# Patient Record
Sex: MALE | Race: WHITE | Employment: STUDENT | URBAN - METROPOLITAN AREA
[De-identification: names, ages, dates, MRNs, and addresses within clinical notes are randomized per-mention and may not be internally consistent; named-entity substitution may affect disease eponyms.]

---

## 2023-11-10 ENCOUNTER — APPOINTMENT (OUTPATIENT)
Dept: GENERAL RADIOLOGY | Age: 23
End: 2023-11-10
Attending: EMERGENCY MEDICINE
Payer: COMMERCIAL

## 2023-11-10 ENCOUNTER — HOSPITAL ENCOUNTER (EMERGENCY)
Age: 23
Discharge: HOME | End: 2023-11-10
Attending: EMERGENCY MEDICINE
Payer: COMMERCIAL

## 2023-11-10 VITALS
DIASTOLIC BLOOD PRESSURE: 71 MMHG | RESPIRATION RATE: 16 BRPM | WEIGHT: 110 LBS | HEIGHT: 63 IN | OXYGEN SATURATION: 97 % | TEMPERATURE: 98 F | HEART RATE: 75 BPM | SYSTOLIC BLOOD PRESSURE: 139 MMHG | BODY MASS INDEX: 19.49 KG/M2

## 2023-11-10 DIAGNOSIS — R07.9 CHEST PAIN OF UNCERTAIN ETIOLOGY: Primary | ICD-10-CM

## 2023-11-10 LAB
ALBUMIN SERPL-MCNC: 4.2 G/DL (ref 3.4–5)
ALBUMIN/GLOB SERPL: 1.2 {RATIO} (ref 1–2)
ALP LIVER SERPL-CCNC: 104 U/L
ALT SERPL-CCNC: 22 U/L
ANION GAP SERPL CALC-SCNC: 6 MMOL/L (ref 0–18)
AST SERPL-CCNC: 20 U/L (ref 15–37)
ATRIAL RATE: 101 BPM
BASOPHILS # BLD AUTO: 0.03 X10(3) UL (ref 0–0.2)
BASOPHILS NFR BLD AUTO: 0.4 %
BILIRUB SERPL-MCNC: 0.5 MG/DL (ref 0.1–2)
BUN BLD-MCNC: 12 MG/DL (ref 9–23)
CALCIUM BLD-MCNC: 8.9 MG/DL (ref 8.5–10.1)
CHLORIDE SERPL-SCNC: 105 MMOL/L (ref 98–112)
CO2 SERPL-SCNC: 27 MMOL/L (ref 21–32)
CREAT BLD-MCNC: 1.05 MG/DL
D DIMER PPP FEU-MCNC: <0.27 UG/ML FEU (ref ?–0.5)
EGFRCR SERPLBLD CKD-EPI 2021: 103 ML/MIN/1.73M2 (ref 60–?)
EOSINOPHIL # BLD AUTO: 0.14 X10(3) UL (ref 0–0.7)
EOSINOPHIL NFR BLD AUTO: 1.9 %
ERYTHROCYTE [DISTWIDTH] IN BLOOD BY AUTOMATED COUNT: 13.1 %
GLOBULIN PLAS-MCNC: 3.4 G/DL (ref 2.8–4.4)
GLUCOSE BLD-MCNC: 99 MG/DL (ref 70–99)
HCT VFR BLD AUTO: 45.2 %
HGB BLD-MCNC: 16.3 G/DL
IMM GRANULOCYTES # BLD AUTO: 0.02 X10(3) UL (ref 0–1)
IMM GRANULOCYTES NFR BLD: 0.3 %
LYMPHOCYTES # BLD AUTO: 1.9 X10(3) UL (ref 1–4)
LYMPHOCYTES NFR BLD AUTO: 25.2 %
MCH RBC QN AUTO: 30.2 PG (ref 26–34)
MCHC RBC AUTO-ENTMCNC: 36.1 G/DL (ref 31–37)
MCV RBC AUTO: 83.9 FL
MONOCYTES # BLD AUTO: 0.52 X10(3) UL (ref 0.1–1)
MONOCYTES NFR BLD AUTO: 6.9 %
NEUTROPHILS # BLD AUTO: 4.92 X10 (3) UL (ref 1.5–7.7)
NEUTROPHILS # BLD AUTO: 4.92 X10(3) UL (ref 1.5–7.7)
NEUTROPHILS NFR BLD AUTO: 65.3 %
OSMOLALITY SERPL CALC.SUM OF ELEC: 286 MOSM/KG (ref 275–295)
P AXIS: 75 DEGREES
P-R INTERVAL: 132 MS
PLATELET # BLD AUTO: 293 10(3)UL (ref 150–450)
POTASSIUM SERPL-SCNC: 3.6 MMOL/L (ref 3.5–5.1)
PROT SERPL-MCNC: 7.6 G/DL (ref 6.4–8.2)
Q-T INTERVAL: 350 MS
QRS DURATION: 86 MS
QTC CALCULATION (BEZET): 453 MS
R AXIS: 76 DEGREES
RBC # BLD AUTO: 5.39 X10(6)UL
SARS-COV-2 RNA RESP QL NAA+PROBE: NOT DETECTED
SODIUM SERPL-SCNC: 138 MMOL/L (ref 136–145)
T AXIS: 33 DEGREES
TROPONIN I SERPL HS-MCNC: 5 NG/L
VENTRICULAR RATE: 101 BPM
WBC # BLD AUTO: 7.5 X10(3) UL (ref 4–11)

## 2023-11-10 PROCEDURE — 84484 ASSAY OF TROPONIN QUANT: CPT | Performed by: EMERGENCY MEDICINE

## 2023-11-10 PROCEDURE — 99285 EMERGENCY DEPT VISIT HI MDM: CPT

## 2023-11-10 PROCEDURE — 96374 THER/PROPH/DIAG INJ IV PUSH: CPT

## 2023-11-10 PROCEDURE — 85379 FIBRIN DEGRADATION QUANT: CPT | Performed by: EMERGENCY MEDICINE

## 2023-11-10 PROCEDURE — 80053 COMPREHEN METABOLIC PANEL: CPT | Performed by: EMERGENCY MEDICINE

## 2023-11-10 PROCEDURE — 99284 EMERGENCY DEPT VISIT MOD MDM: CPT

## 2023-11-10 PROCEDURE — 85025 COMPLETE CBC W/AUTO DIFF WBC: CPT | Performed by: EMERGENCY MEDICINE

## 2023-11-10 PROCEDURE — 93010 ELECTROCARDIOGRAM REPORT: CPT

## 2023-11-10 PROCEDURE — 93005 ELECTROCARDIOGRAM TRACING: CPT

## 2023-11-10 PROCEDURE — 96361 HYDRATE IV INFUSION ADD-ON: CPT

## 2023-11-10 PROCEDURE — 71045 X-RAY EXAM CHEST 1 VIEW: CPT | Performed by: EMERGENCY MEDICINE

## 2023-11-10 RX ORDER — KETOROLAC TROMETHAMINE 15 MG/ML
15 INJECTION, SOLUTION INTRAMUSCULAR; INTRAVENOUS ONCE
Status: COMPLETED | OUTPATIENT
Start: 2023-11-10 | End: 2023-11-10

## 2023-11-10 NOTE — ED INITIAL ASSESSMENT (HPI)
Pt states he has been having chest pain for the last 3 days and did have GI issues after drinking well water.  Pt unsure if nauseous, sometimes sob, dizziness

## 2024-06-27 ENCOUNTER — TELEPHONE (OUTPATIENT)
Dept: ORTHOPEDICS CLINIC | Facility: CLINIC | Age: 24
End: 2024-06-27

## 2024-06-27 ENCOUNTER — OFFICE VISIT (OUTPATIENT)
Dept: ORTHOPEDICS CLINIC | Facility: CLINIC | Age: 24
End: 2024-06-27

## 2024-06-27 ENCOUNTER — HOSPITAL ENCOUNTER (OUTPATIENT)
Dept: GENERAL RADIOLOGY | Age: 24
Discharge: HOME | End: 2024-06-27
Attending: NURSE PRACTITIONER
Payer: COMMERCIAL

## 2024-06-27 VITALS — HEIGHT: 65 IN | BODY MASS INDEX: 18.33 KG/M2 | WEIGHT: 110 LBS

## 2024-06-27 DIAGNOSIS — M53.3 COCCYDYNIA: Primary | ICD-10-CM

## 2024-06-27 DIAGNOSIS — M54.50 ACUTE RIGHT-SIDED LOW BACK PAIN WITHOUT SCIATICA: ICD-10-CM

## 2024-06-27 DIAGNOSIS — M54.9 BACK PAIN, UNSPECIFIED BACK LOCATION, UNSPECIFIED BACK PAIN LATERALITY, UNSPECIFIED CHRONICITY: Primary | ICD-10-CM

## 2024-06-27 DIAGNOSIS — M54.9 BACK PAIN, UNSPECIFIED BACK LOCATION, UNSPECIFIED BACK PAIN LATERALITY, UNSPECIFIED CHRONICITY: ICD-10-CM

## 2024-06-27 DIAGNOSIS — R93.89 ABNORMAL X-RAY: ICD-10-CM

## 2024-06-27 PROCEDURE — 99204 OFFICE O/P NEW MOD 45 MIN: CPT | Performed by: NURSE PRACTITIONER

## 2024-06-27 PROCEDURE — 72220 X-RAY EXAM SACRUM TAILBONE: CPT | Performed by: NURSE PRACTITIONER

## 2024-06-27 RX ORDER — TIZANIDINE 4 MG/1
TABLET ORAL
COMMUNITY
Start: 2024-06-17

## 2024-06-27 RX ORDER — PREDNISONE 20 MG/1
TABLET ORAL
COMMUNITY
Start: 2024-06-17

## 2024-06-27 NOTE — H&P
[facility] - ORTH[facility]  [address] [place] [address], [address], [place] [address]  [address] [place] [address]  [phone]     NEW PATIENT VISIT - HISTORY AND PHYSICAL EXAMINATION     Name: Karan Kyle   MRN: [id number]  Date: 6/27/2024     CC:   Chief Complaint   Patient presents with    Other     Fracture of sacrum from mid Feb  Sacrum pain   Patient re injured him self at work with lifting items about 2 weeks ago from upcoming Saturday  Patient has been seen in urgent care in [place]      REFERRED BY: self  PCP: MUNA SEBASTIAN MD    HPI:   Karan Kyle is a very pleasant 23 year old male who presents today for evaluation of back and leg pain. The distribution of symptoms are: 50% backpain and 50% right leg pain. Has intermittent right leg shooting pain in the right buttock, right lateral hip to the right groin and down the right leg. [facility] has had intermittent left groin area pain. The symptoms began 12/2023 with original work injury with diagnosed right sacral fracture and coccyx dislocation with fracture per patient. [facility] was advised MRI with [facility] and completed this, felt better and did not follow up as was advised for the MRI review. 2 weeks ago had a new work injury lifting and felt a pop in the back with similar pain as his right sacral fracture pain and coccyx pain returned. Yesterday had a new injury at work, left foot crushed and currently was seen in the urgent care with ace wrap to the left foot and on crutches. Off work due to new left foot injury. Feels that crutches are causing increase in right sided low back pain. Since the onset, the symptoms have become slowly worse over time. Patient feels pain is aggravated by standing, sitting and improved by laying. The patient reports + numbness with new crush injury of the left foot and denies weakness.  The symptom characteristics are as follows: sharp.     Prior spine surgery: none.      Bowel and bladder symptoms: absent.  Fall/injury: 12/2023, 6/15/2024  for the sacral area pain and 6/26/2024 for left foot injury    The patient has not had issues with balance and/or hand dexterity problems such as changes in penmanship or the use of buttons or zippers.    Treatment up to this time has included:  Evaluation: none in EMR  NSAIDS: have been partially helpful taking 600mg ibuprofen TID  Narcotic use: None  Physical therapy: None  Spinal injections: None    PMH:   History reviewed. No pertinent past medical history.    PAST SURGICAL HX:  History reviewed. No pertinent surgical history.    FAMILY HX:  History reviewed. No pertinent family history.    ALLERGIES:  Hydrocodone-acetaminophen    MEDICATIONS:   Current Outpatient Medications   Medication Sig Dispense Refill    predniSONE 20 MG Oral Tab       tiZANidine 4 MG Oral Tab       Alum & Mag Hydroxide-Simeth (MAALOX) 200-200-20 MG/5ML Oral Suspension Take  by mouth as needed.         ROS: A comprehensive 14 point review of systems was performed and was negative aside from the aforementioned per history of present illness.    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    Weight as of this encounter: 110 lb (49.9 kg).    Physical Exam  Constitutional:       Appearance: Normal appearance.   HENT:      Head: Normocephalic and atraumatic.   Eyes:      Extraocular Movements: Extraocular movements intact.   Cardiovascular:      Pulses: Normal pulses. Skin warm and well perfused.  Pulmonary:      Effort: Pulmonary effort is  normal. No respiratory distress.   Skin:     General: Skin is warm.   Psychiatric:         Mood and Affect: Mood normal.     Spine Exam:    Unable to assess gait as currently not weight bearing on the left. Coordinates well with crutches.   Level shoulders and hips in even stance    Restricted L-spine ROM    No tenderness to palpation of L-spine    Straight leg raise test: negative    Sustained clonus: negative    LE Strength: 5/5 IP QUAD TA EHL GSC, unable to assess left TA, EHL, GSC due to foot injury  LE Sensation: normal in L2-S1 distribution, except reports decrease sensation over the left foot- not assessed due to recent injury  LE reflexes: normal, except left achilles and babinski not assessed.     Radiographic Examination/Diagnostics:  xray personally viewed, independently interpreted and radiology report was reviewed.    Radiographs  Dated:06/27/24   Study:Sacrum & Coccyx xray  Demonstrates: Subluxed coccyx with minimal displacement on stress views. 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     IMPRESSION: Karan Kyle is a 23 year old male with   1. Coccydynia  - MRI SACRUM/COCCYX SH(CPT=72195); Future  - PHYSICAL THERAPY EXTERNAL  - Pain Management Referral - In Network    2. Acute right-sided low back pain without sciatica  - MRI SACRUM/COCCYX SH(CPT=72195); Future  - PHYSICAL THERAPY EXTERNAL  - Pain Management Referral - In Network    3. Abnormal x-ray  - MRI SACRUM/COCCYX SH(CPT=72195); Future    PLAN:     We reviewed the patients history, symptoms, exam findings, and imaging today.  We had a detailed discussion outlining the etiology, anatomy, pathophysiology, and natural history of coccydynia and historic sacral fracture. The typical management of this condition may include lifestyle modification, NSAIDs, physical therapy, oral steroids, and epidural injections.  Based on our discussion today we would like to have the patient initiate our recommendations for continued  conservative therapy in the treatment of their condition noted in the assessment section.       -Continue OTC pain medications as needed. List provided on AVS.   -complete MRI of the sacrum/coccyx to rule out acute fracture. Advised if no new fracture to start physical therapy for the low back. If with acute fracture to rest for 6 weeks and then start physical therapy.   -See pain clinic to review treatment for the coccyx pain.   -Off work for 2 weeks.     FOLLOW-UP:  We will see him back in follow-up in 2 weeks, or sooner if any problems arise. Patient understands and agrees with plan.    JORDAN Cornell   Collaborative: Rubens Fuentes MD  Orthopedic Spine Surgeon  [facility]   [address] [place] [address]   t: [phone]   f: [phone]        This note was dictated using Dragon software.  While it was briefly proofread prior to completion, some grammatical, spelling, and word choice errors due to dictation may still occur.

## 2024-06-27 NOTE — PATIENT INSTRUCTIONS
Start tylenol over the counter as directed on package. Okay to take Tylenol 1000mg up to three times per day. Do not exceed 3000mg of tylenol per day.   Use over the counter antiinflammatories such as Aleve or ibuprofen 400-600mg three times per day as directed on the package and as tolerated. Take with food. Stop if any stomach pains or discomfort.   Use over the counter SalonPas 4% lidocaine patch or Tiger balm over the counter lidocaine product as directed on the package.   Use heat and ice as needed and as tolerated.   Continue your home exercise program.

## 2024-07-11 ENCOUNTER — TELEPHONE (OUTPATIENT)
Dept: ORTHOPEDICS CLINIC | Facility: CLINIC | Age: 24
End: 2024-07-11

## 2024-07-11 NOTE — TELEPHONE ENCOUNTER
Called and spoke with patient.    He has not completed his MRI or scheduled for the pain clinic.  Patient not to come in until he's completed these things for further treatment discussions.    Patient will cancel today's appointment. Would like an updated work note until patient schedules the MRI today.    Patient was advised to call us when he's completed the MRI so he can schedule an appointment.    MyChart is pending.   Will call patient back and make sure he signs up for MyChart so note can be entered.

## (undated) NOTE — LETTER
Patient Name: Karan Kyle  YOB: 2000 07/11/24      To Whom It May Concern:    Above patient is under my care. Patient was seen in clinic 06/27/24. Based on this visit, the patient has the following restrictions:      [x] Patient may not return to work for 2 weeks. This will be evaluated at the next visit due within 2 weeks.            Thank you very much,        Deysi Riggs, AKIKOP-BC, RNFA  Collaborative: Rubens Fuentes MD  Orthopedic Spine Surgeon  [facility]   [address] [place] [address]   t: [phone]   f: [phone]

## (undated) NOTE — LETTER
Date & Time: 11/10/2023, 9:02 AM  Patient: Vimal Nicole  Encounter Provider(s): Ashok Bradley MD       To Whom It May Concern:    Karo Kent was seen and treated in our department on 11/10/2023. He should not return to work until 11/12/23 .     If you have any questions or concerns, please do not hesitate to call.        _____________________________  Physician/APC Signature

## (undated) NOTE — LETTER
Patient Name: Karan Kyle  YOB: 2000 06/27/24      To Whom It May Concern:    Above patient is under my care. Patient was seen in clinic 06/27/24. Based on this visit, the patient has the following restrictions:      [x] Patient may not return to work for 2 weeks. This will be evaluated at the next visit.        Please do not hesitate to contact me with any questions or concerns.    Thank you very much,    Electronically signed by JORDAN Cornell 06/27/24 2:16 PM      Deysi Riggs, JODY-BC, RNFA  Collaborative: Rubens Fuentes MD  Orthopedic Spine Surgeon  [facility]   [address] [place] [address], [address] [place] [address]   t: [phone]   f: [phone]